# Patient Record
Sex: FEMALE | Race: OTHER | NOT HISPANIC OR LATINO | URBAN - METROPOLITAN AREA
[De-identification: names, ages, dates, MRNs, and addresses within clinical notes are randomized per-mention and may not be internally consistent; named-entity substitution may affect disease eponyms.]

---

## 2017-05-25 ENCOUNTER — EMERGENCY (EMERGENCY)
Facility: HOSPITAL | Age: 22
LOS: 1 days | Discharge: HOME | End: 2017-05-25
Attending: EMERGENCY MEDICINE | Admitting: EMERGENCY MEDICINE
Payer: COMMERCIAL

## 2017-05-25 VITALS
TEMPERATURE: 98 F | RESPIRATION RATE: 20 BRPM | SYSTOLIC BLOOD PRESSURE: 122 MMHG | DIASTOLIC BLOOD PRESSURE: 67 MMHG | OXYGEN SATURATION: 100 % | HEART RATE: 110 BPM

## 2017-05-25 PROCEDURE — 99218: CPT

## 2017-05-25 RX ORDER — SODIUM CHLORIDE 9 MG/ML
1000 INJECTION INTRAMUSCULAR; INTRAVENOUS; SUBCUTANEOUS ONCE
Qty: 0 | Refills: 0 | Status: COMPLETED | OUTPATIENT
Start: 2017-05-25 | End: 2017-05-25

## 2017-05-25 RX ORDER — FAMOTIDINE 10 MG/ML
20 INJECTION INTRAVENOUS ONCE
Qty: 0 | Refills: 0 | Status: COMPLETED | OUTPATIENT
Start: 2017-05-25 | End: 2017-05-25

## 2017-05-25 RX ORDER — EPINEPHRINE 0.3 MG/.3ML
0.3 INJECTION INTRAMUSCULAR; SUBCUTANEOUS ONCE
Qty: 0 | Refills: 0 | Status: COMPLETED | OUTPATIENT
Start: 2017-05-25 | End: 2017-05-25

## 2017-05-25 RX ADMIN — Medication 125 MILLIGRAM(S): at 23:19

## 2017-05-25 RX ADMIN — SODIUM CHLORIDE 1000 MILLILITER(S): 9 INJECTION INTRAMUSCULAR; INTRAVENOUS; SUBCUTANEOUS at 23:19

## 2017-05-25 RX ADMIN — FAMOTIDINE 20 MILLIGRAM(S): 10 INJECTION INTRAVENOUS at 23:19

## 2017-05-25 RX ADMIN — EPINEPHRINE 0.3 MILLIGRAM(S): 0.3 INJECTION INTRAMUSCULAR; SUBCUTANEOUS at 23:14

## 2017-05-25 NOTE — ED ADULT TRIAGE NOTE - CHIEF COMPLAINT QUOTE
pt c/o of sudden onset allergic reaction s/p eating shrimp. lips and tongue noted to be swollen, speech muffled. hives noted to face and body. charge RN called, pt to go directly to  4

## 2017-05-25 NOTE — ED CDU PROVIDER NOTE - MEDICAL DECISION MAKING DETAILS
Observe in CDU s/p Anaphylaxis meds (epi, stz, benadryl, pepcid) x 6 hrs post-med administration ~ 5:15am.

## 2017-05-25 NOTE — ED CDU PROVIDER NOTE - OBJECTIVE STATEMENT
MD Ibarra:  23 yo F acute onset hives, throat swelling after eating shellfish.  +allergic to azithromycin; but no known food allergies.  Quality of ENT symptoms is itchy and a closing sensation.  Location of hives is all over body (face > neck > chest).  Intensity 8/10.  Associated Sx:  no CP/N/V/    CDU SHAGUFTA Yousif: HPI Reviewed above. Pt with hives after having shrimp for dinner- has ate in the past with no issues. Presented to the ED with hives, throat and tongue swelling. Pt stats only allergy is to Dust. In the ED patient was given epi, pepcid, and solumedrol with resolution of symptoms. Came to the CDU without hives, in no acute distress breathing and speaking without difficulty. Was sent here for observation after Epi administration. Pt with no complaints at this time.

## 2017-05-25 NOTE — ED PROVIDER NOTE - MEDICAL DECISION MAKING DETAILS
MD Ibarra:  23 yo F acute onset hives, throat swelling after eating shellfish.  +allergic to azithromycin; but no known food allergies.  Quality of ENT symptoms is itchy and a closing sensation.  Location of hives is all over body (face > neck > chest).  Intensity 8/10.  VS - wnl.  Physical Exam: adult F, near-confluent hives over her face, normal voice.  No stridor/drooling.  Lungs CTA B.  AAOx3. Anxious.  Impression: anaphylaxis to shellfish.  Plan:  patient already took 50mg bendaryl PO prior to arrival.  We have administered 0.3mg 1:1000 Epi IM, solumedrol 125mg IV x1, pepcid 20mg IV.  Likely CDU for 6 hrs observation.

## 2017-05-25 NOTE — ED PROVIDER NOTE - PROGRESS NOTE DETAILS
Dr. Riggs:  I performed a face to face bedside interview with patient regarding history of present illness, review of symptoms and past medical history. I completed an independent physical exam.  I have discussed patient's plan of care with PA.   I agree with note as stated above, having amended the EMR as needed to reflect my findings.   This includes HISTORY OF PRESENT ILLNESS, HIV, PAST MEDICAL/SURGICAL/FAMILY/SOCIAL HISTORY, ALLERGIES AND HOME MEDICATIONS, REVIEW OF SYSTEMS, PHYSICAL EXAM, and any PROGRESS NOTES during the time I functioned as the attending physician for this patient.    ATTENDING DISCHARGE NOTE, KEN:  22F sent to CDU for observation after anaphylaxis reaction in setting of eating shrimp, no known allergies.  Patient feeling well, discharge with prednisone, antihistamine, epipen.  Given referral to allergist and internal medicine physicians, strict return precautions. Dr. Riggs:  I performed a face to face bedside interview with patient regarding history of present illness, review of symptoms and past medical history. I completed an independent physical exam.  I have discussed patient's plan of care with PA.   I agree with note as stated above, having amended the EMR as needed to reflect my findings.   This includes HISTORY OF PRESENT ILLNESS, HIV, PAST MEDICAL/SURGICAL/FAMILY/SOCIAL HISTORY, ALLERGIES AND HOME MEDICATIONS, REVIEW OF SYSTEMS, PHYSICAL EXAM, and any PROGRESS NOTES during the time I functioned as the attending physician for this patient.    ATTENDING DISCHARGE NOTE, KEN:  22F sent to CDU for observation after anaphylaxis reaction in setting of eating shrimp, no known allergies.  Patient feeling well.  Exam:  well appearing, rrr, ctab, abd soft, ntnd, no rashes visibiel . Discharge with prednisone, antihistamine, epipen.  Given referral to allergist and internal medicine physicians, strict return precautions.

## 2017-05-25 NOTE — ED CDU PROVIDER NOTE - ATTENDING CONTRIBUTION TO CARE
MD Ibarra:  I have personally performed a face to face diagnostic evaluation on this patient with the PA.  I have reviewed the ACP note and agree with the history, exam, and plan of care, except as noted.  History and Exam by me shows MD Ibarra:  23 yo F acute onset hives, throat swelling after eating shellfish.  +allergic to azithromycin; but no known food allergies.  Quality of ENT symptoms is itchy and a closing sensation.  Location of hives is all over body (face > neck > chest).  Intensity 8/10.  VS - wnl.  Physical Exam: adult F, near-confluent hives over her face, normal voice.  No stridor/drooling.  Lungs CTA B.  AAOx3. Anxious.  Impression: anaphylaxis to shellfish.  Plan:  has received 50mg bendaryl PO (prior to ED arrival), 0.3mg 1:1000 Epi IM, solumedrol 125mg IV x1, pepcid 20mg IV.  Improvement established in ED for 1 hr, will continue to observe in the CDU until 5:15am.

## 2017-05-25 NOTE — ED PROVIDER NOTE - CONSTITUTIONAL, MLM
normal... anxious appearing, well nourished, awake, alert, oriented to person, place, time/situation and in no apparent distress.

## 2017-05-25 NOTE — ED CDU PROVIDER NOTE - PROGRESS NOTE DETAILS
MD Ibarra:  23 yo F, resolving anaphylaxis s/p 0.3mg Epi, 125mg solumedrol, Pepcid 20mg, Diphenhydramine 50mg.  If patient is asymptomatic 6hrs after medication administration, she may be d/c'd home with 4d prednisone 50mg PO qday. Geovani Yousif: Pt admits to feeling much better- no itching, hives, throat closing , trouble breathing. Pt rested comfortably all night, will dc hmoe with prednisone and epi pen and pepcid benadryl prn. Pt agrees and understands plan, return precautions given. advised to stay away from allergen. follow up with pmd and allergist list provided. Dr. Riggs:  I performed a face to face bedside interview with patient regarding history of present illness, review of symptoms and past medical history. I completed an independent physical exam.  I have discussed patient's plan of care with PA.   I agree with note as stated above, having amended the EMR as needed to reflect my findings.   This includes HISTORY OF PRESENT ILLNESS, HIV, PAST MEDICAL/SURGICAL/FAMILY/SOCIAL HISTORY, ALLERGIES AND HOME MEDICATIONS, REVIEW OF SYSTEMS, PHYSICAL EXAM, and any PROGRESS NOTES during the time I functioned as the attending physician for this patient.    ATTENDING DISCHARGE NOTE, KEN:  22F sent to CDU for observation after anaphylaxis reaction in setting of eating shrimp, no known allergies.  Patient feeling well.  Exam:  well appearing, rrr, ctab, abd soft, ntnd, no rashes visibiel . Discharge with prednisone, antihistamine, epipen.  Given referral to allergist and internal medicine physicians, strict return precautions.

## 2017-05-25 NOTE — ED PROVIDER NOTE - OBJECTIVE STATEMENT
MD Ibarra:  21 yo F acute onset hives, throat swelling after eating shellfish.  +allergic to azithromycin; but no known food allergies.  Quality of ENT symptoms is itchy and a closing sensation.  Location of hives is all over body (face > neck > chest).  Intensity 8/10.  Associated Sx:  no CP/N/V/

## 2017-05-25 NOTE — ED PROVIDER NOTE - ATTENDING CONTRIBUTION TO CARE
MD Ibarra:  patient seen and evaluated with the resident.  I was present for key portions of the History & Physical, and I agree with the Impression & Plan.  MD Ibarra:  21 yo F acute onset hives, throat swelling after eating shellfish.  +allergic to azithromycin; but no known food allergies.  Quality of ENT symptoms is itchy and a closing sensation.  Location of hives is all over body (face > neck > chest).  Intensity 8/10.  VS - wnl.  Physical Exam: adult F, near-confluent hives over her face, normal voice.  No stridor/drooling.  Lungs CTA B.  AAOx3. Anxious.  Impression: anaphylaxis to shellfish.  Plan:  patient already took 50mg bendaryl PO prior to arrival.  We have administered 0.3mg 1:1000 Epi IM, solumedrol 125mg IV x1, pepcid 20mg IV.  Likely CDU for 6 hrs observation.

## 2017-05-25 NOTE — ED CDU PROVIDER NOTE - PLAN OF CARE
Rest, drink plenty of fluids.  Advance activity as tolerated.  Continue all previously prescribed medications as directed. Take prednisone 50 mg once a day for 4 days. Take benadryl and pepcid as directed as needed. Use epipen as directed for severe allergic reaction. Follow up with your primary care physician in 48-72 hours- bring copies of your results. Follow up with allergist- referral list provided. Return to the Emergency Department for worsening or persistent symptoms OR ANY NEW OR CONCERNING SYMPTOMS.

## 2017-05-25 NOTE — ED ADULT NURSE NOTE - OBJECTIVE STATEMENT
Gisella RN: Patient arrives A&Ox3 ambulatory to room 4, states she began having an allergic reaction immediately after eating shrimp. Hives noted to all extremities, lips swollen, able to speak in full sentences at present, no airway compromise noted, VSS, noted in chart. Patient denies any shortness of breath, Spo2 100% on room air, respirations are even and unlabored. Sinus Tach per monitor. 20G IV access obtained. Medicated per orders. Patient states she took 50 diphenhydramine PO prior to ED arrival. NAD noted. Attending MD at bedside. Primary RN in area aware of patient status.

## 2017-05-26 VITALS
RESPIRATION RATE: 16 BRPM | TEMPERATURE: 98 F | DIASTOLIC BLOOD PRESSURE: 57 MMHG | SYSTOLIC BLOOD PRESSURE: 98 MMHG | HEART RATE: 71 BPM | OXYGEN SATURATION: 98 %

## 2017-05-26 PROCEDURE — 99217: CPT

## 2017-05-26 RX ORDER — EPINEPHRINE 0.3 MG/.3ML
0.3 INJECTION INTRAMUSCULAR; SUBCUTANEOUS
Qty: 1 | Refills: 0 | OUTPATIENT
Start: 2017-05-26

## 2019-02-12 PROBLEM — Z00.00 ENCOUNTER FOR PREVENTIVE HEALTH EXAMINATION: Status: ACTIVE | Noted: 2019-02-12

## 2019-03-21 ENCOUNTER — RESULT REVIEW (OUTPATIENT)
Age: 24
End: 2019-03-21

## 2019-04-02 ENCOUNTER — APPOINTMENT (OUTPATIENT)
Dept: NEUROLOGY | Facility: CLINIC | Age: 24
End: 2019-04-02

## 2021-01-13 ENCOUNTER — RESULT REVIEW (OUTPATIENT)
Age: 26
End: 2021-01-13

## 2021-01-13 ENCOUNTER — OUTPATIENT (OUTPATIENT)
Dept: OUTPATIENT SERVICES | Facility: HOSPITAL | Age: 26
LOS: 1 days | End: 2021-01-13
Payer: COMMERCIAL

## 2021-01-13 ENCOUNTER — APPOINTMENT (OUTPATIENT)
Dept: MAMMOGRAPHY | Facility: CLINIC | Age: 26
End: 2021-01-13
Payer: COMMERCIAL

## 2021-01-13 ENCOUNTER — APPOINTMENT (OUTPATIENT)
Dept: ULTRASOUND IMAGING | Facility: CLINIC | Age: 26
End: 2021-01-13
Payer: COMMERCIAL

## 2021-01-13 DIAGNOSIS — Z00.8 ENCOUNTER FOR OTHER GENERAL EXAMINATION: ICD-10-CM

## 2021-01-13 PROCEDURE — G0279: CPT | Mod: 26

## 2021-01-13 PROCEDURE — 76641 ULTRASOUND BREAST COMPLETE: CPT | Mod: 26,50

## 2021-01-13 PROCEDURE — 77066 DX MAMMO INCL CAD BI: CPT | Mod: 26

## 2021-01-13 PROCEDURE — 76641 ULTRASOUND BREAST COMPLETE: CPT

## 2021-01-13 PROCEDURE — 77066 DX MAMMO INCL CAD BI: CPT

## 2021-01-13 PROCEDURE — G0279: CPT

## 2021-04-01 ENCOUNTER — TRANSCRIPTION ENCOUNTER (OUTPATIENT)
Age: 26
End: 2021-04-01

## 2021-05-10 ENCOUNTER — APPOINTMENT (OUTPATIENT)
Dept: DISASTER EMERGENCY | Facility: OTHER | Age: 26
End: 2021-05-10
Payer: COMMERCIAL

## 2021-05-10 PROCEDURE — 0012A: CPT

## 2023-10-11 ENCOUNTER — EMERGENCY (EMERGENCY)
Facility: HOSPITAL | Age: 28
LOS: 1 days | Discharge: HOME | End: 2023-10-11
Attending: STUDENT IN AN ORGANIZED HEALTH CARE EDUCATION/TRAINING PROGRAM | Admitting: STUDENT IN AN ORGANIZED HEALTH CARE EDUCATION/TRAINING PROGRAM
Payer: COMMERCIAL

## 2023-10-11 VITALS
RESPIRATION RATE: 17 BRPM | SYSTOLIC BLOOD PRESSURE: 112 MMHG | DIASTOLIC BLOOD PRESSURE: 62 MMHG | OXYGEN SATURATION: 100 % | HEART RATE: 70 BPM | TEMPERATURE: 99 F

## 2023-10-11 PROCEDURE — 99284 EMERGENCY DEPT VISIT MOD MDM: CPT

## 2023-10-11 RX ORDER — DIAZEPAM 5 MG
1 TABLET ORAL
Qty: 6 | Refills: 0
Start: 2023-10-11 | End: 2023-10-13

## 2023-10-11 RX ORDER — KETOROLAC TROMETHAMINE 30 MG/ML
15 SYRINGE (ML) INJECTION ONCE
Refills: 0 | Status: DISCONTINUED | OUTPATIENT
Start: 2023-10-11 | End: 2023-10-11

## 2023-10-11 RX ORDER — DIAZEPAM 5 MG
5 TABLET ORAL ONCE
Refills: 0 | Status: DISCONTINUED | OUTPATIENT
Start: 2023-10-11 | End: 2023-10-11

## 2023-10-11 RX ADMIN — Medication 15 MILLIGRAM(S): at 11:09

## 2023-10-11 RX ADMIN — Medication 5 MILLIGRAM(S): at 11:09

## 2023-10-11 NOTE — ED ADULT TRIAGE NOTE - CHIEF COMPLAINT QUOTE
Pt coming to ER c/o neck pain that started this morning after stretching. States she is unable to move her neck at this time without experiencing shooting pain. Endorsing nausea at this time.

## 2023-10-11 NOTE — ED PROVIDER NOTE - PHYSICAL EXAMINATION
General: well appearing, interactive, well nourished, no apparent distress, ncat  HEENT: EOMI, PERRLA, normal mucosa, normal oropharynx, no lesions on the lips or on oral mucosa, normal external ear  Neck: supple, no lymphadenopathy, full range of motion, no nuchal rigidity  CV: RRR, radial pulse 2+b/l  Resp: non labored breathing   : no CVA tenderness  MSK: full range of motion, no cyanosis, no edema, no clubbing, no immobility, no ml spinal ttp, hypertonicity of r SCM  Neuro: CN2-12 grossly intact. EOMI. 5/5 strength in UE and LE b/l.  Sensation intact in UE/LE b/l.  No dysdiadochokinesia. Gait nl   Skin: no rashes, skin intact

## 2023-10-11 NOTE — ED PROVIDER NOTE - PROGRESS NOTE DETAILS
Marito Aponte DO: Patient reassessed, NAD, non-toxic appearing. results dw pt/family, questions answered. improved sx and rom. strict return precautions provided. no neuro deficits or complaints.

## 2023-10-11 NOTE — ED ADULT NURSE NOTE - PAIN: PRESENCE, MLM
Addended by: Margarito Sauer on: 9/14/2017 02:39 PM     Modules accepted: Orders
complains of pain/discomfort

## 2023-10-11 NOTE — ED ADULT NURSE NOTE - OBJECTIVE STATEMENT
pt with pain to right side of neck. Pt states after stretching developed pain , pt medicated for her symptoms awaiting dispo.

## 2023-10-11 NOTE — ED PROVIDER NOTE - CLINICAL SUMMARY MEDICAL DECISION MAKING FREE TEXT BOX
Marito Aponte, DO: 29 yo f no reported pmh , pw neck pain. PW mother bedside provides collateral.  Patient reports this morning she woke up in usual state of health, stress test usually does not felt acute onset neck pain while stretching.  Has difficulty ranging neck rotating neck rightward.  Reports spasm, pain.  Pain is severe causing her to feel lightheaded.  Denies vertiginous symptoms as indicated in triage note.  Denies N/V, vision change, paroxysmal weakness.  Denies cough.Patient is HDS, well-appearing, neurovascular intact.  PE as noted.  No neurovascular deficits.  Do not suspect vertebral artery dissection.  Do not suspect , cord compression.  Likely sprain/strain or lateral disc herniation.  Will treat symptoms, reassess.  No indication for emergent imaging upon initial evaluation.

## 2023-10-11 NOTE — ED PROVIDER NOTE - OBJECTIVE STATEMENT
27 yo f no reported pmh , pw neck pain. PW mother bedside provides collateral.  Patient reports this morning she woke up in usual state of health, stress test usually does not felt acute onset neck pain while stretching.  Has difficulty ranging neck rotating neck rightward.  Reports spasm, pain.  Pain is severe causing her to feel lightheaded.  Denies vertiginous symptoms as indicated in triage note.  Denies N/V, vision change, paroxysmal weakness.  Denies cough.
Constitutional: no fever, chills    All other ROS neg except as per HPI

## 2023-10-11 NOTE — ED PROVIDER NOTE - NSFOLLOWUPINSTRUCTIONS_ED_ALL_ED_FT
1) Please follow up with your Primary Care Provider in 24-48 hours  2) Seek immediate medical care for any new or returning symptoms including but not limited severe pain, weakness, numbness and/or tingling, vision changes, headaches  3) Take Tylenol 650 mg every 4-6 hours as needed for pain. Do not take more than 2 grams within a 24 hour period  4) Take Ibuprofen 400-600 mg every 4-6 hours as needed for pain. Do not take more than 1200 mg within a 24 hour period. Take this medication with food  5) You may take Valium twice a day for muscle spasms. Do not operate heavy machinery or make critical decisions when taking this medication

## 2023-10-11 NOTE — ED PROVIDER NOTE - PATIENT PORTAL LINK FT
You can access the FollowMyHealth Patient Portal offered by [facility] by registering at the following website: [email or web address]. By joining [employer]’s FollowMyHealth portal, you will also be able to view your health information using other applications (apps) compatible with our system.

## 2024-03-05 ENCOUNTER — RESULT REVIEW (OUTPATIENT)
Age: 29
End: 2024-03-05

## 2024-12-04 NOTE — ED PROVIDER NOTE - CARE PLAN
----- Message from [employer] sent at 12/4/2024  8:37 AM CST -----  Contact: JOSE JIMÉNEZ [[id number]]  .Type:  Patient Requesting Call    Who Called:JOSE JIMÉNEZ [[id number]]  Does the patient know what this is regarding?:discuss appt date   Would the patient rather a call back or a response via [facility]? call  Best Call Back Number:[phone]  Additional Information:   Principal Discharge DX:	Anaphylaxis due to crustaceans, initial encounter